# Patient Record
Sex: MALE | NOT HISPANIC OR LATINO | Employment: FULL TIME | URBAN - METROPOLITAN AREA
[De-identification: names, ages, dates, MRNs, and addresses within clinical notes are randomized per-mention and may not be internally consistent; named-entity substitution may affect disease eponyms.]

---

## 2017-01-11 ENCOUNTER — HOSPITAL ENCOUNTER (OUTPATIENT)
Facility: CLINIC | Age: 47
Setting detail: OBSERVATION
Discharge: HOME | End: 2017-01-12
Attending: EMERGENCY MEDICINE | Admitting: INTERNAL MEDICINE
Payer: COMMERCIAL

## 2017-01-11 ENCOUNTER — APPOINTMENT (OUTPATIENT)
Dept: GENERAL RADIOLOGY | Facility: CLINIC | Age: 47
End: 2017-01-11
Attending: EMERGENCY MEDICINE
Payer: COMMERCIAL

## 2017-01-11 DIAGNOSIS — K21.9 GASTROESOPHAGEAL REFLUX DISEASE WITHOUT ESOPHAGITIS: Primary | ICD-10-CM

## 2017-01-11 DIAGNOSIS — R06.02 SHORTNESS OF BREATH: ICD-10-CM

## 2017-01-11 LAB
ALBUMIN SERPL-MCNC: 3.8 G/DL (ref 3.4–5)
ALP SERPL-CCNC: 62 U/L (ref 40–150)
ALT SERPL W P-5'-P-CCNC: 39 U/L (ref 0–70)
ANION GAP SERPL CALCULATED.3IONS-SCNC: 7 MMOL/L (ref 3–14)
AST SERPL W P-5'-P-CCNC: 24 U/L (ref 0–45)
BASOPHILS # BLD AUTO: 0 10E9/L (ref 0–0.2)
BASOPHILS NFR BLD AUTO: 0.2 %
BILIRUB SERPL-MCNC: 0.6 MG/DL (ref 0.2–1.3)
BUN SERPL-MCNC: 15 MG/DL (ref 7–30)
CALCIUM SERPL-MCNC: 9.2 MG/DL (ref 8.5–10.1)
CHLORIDE SERPL-SCNC: 104 MMOL/L (ref 94–109)
CO2 SERPL-SCNC: 28 MMOL/L (ref 20–32)
CREAT SERPL-MCNC: 1.29 MG/DL (ref 0.66–1.25)
D DIMER PPP FEU-MCNC: NORMAL UG/ML FEU (ref 0–0.5)
DIFFERENTIAL METHOD BLD: NORMAL
EOSINOPHIL # BLD AUTO: 0.2 10E9/L (ref 0–0.7)
EOSINOPHIL NFR BLD AUTO: 2.6 %
ERYTHROCYTE [DISTWIDTH] IN BLOOD BY AUTOMATED COUNT: 12.5 % (ref 10–15)
GFR SERPL CREATININE-BSD FRML MDRD: 60 ML/MIN/1.7M2
GLUCOSE SERPL-MCNC: 100 MG/DL (ref 70–99)
HCT VFR BLD AUTO: 47.1 % (ref 40–53)
HGB BLD-MCNC: 16.5 G/DL (ref 13.3–17.7)
IMM GRANULOCYTES # BLD: 0 10E9/L (ref 0–0.4)
IMM GRANULOCYTES NFR BLD: 0.3 %
LYMPHOCYTES # BLD AUTO: 1.9 10E9/L (ref 0.8–5.3)
LYMPHOCYTES NFR BLD AUTO: 30.6 %
MCH RBC QN AUTO: 30.3 PG (ref 26.5–33)
MCHC RBC AUTO-ENTMCNC: 35 G/DL (ref 31.5–36.5)
MCV RBC AUTO: 87 FL (ref 78–100)
MONOCYTES # BLD AUTO: 0.5 10E9/L (ref 0–1.3)
MONOCYTES NFR BLD AUTO: 8.1 %
NEUTROPHILS # BLD AUTO: 3.6 10E9/L (ref 1.6–8.3)
NEUTROPHILS NFR BLD AUTO: 58.2 %
NRBC # BLD AUTO: 0 10*3/UL
NRBC BLD AUTO-RTO: 0 /100
PLATELET # BLD AUTO: 191 10E9/L (ref 150–450)
POTASSIUM SERPL-SCNC: 3.8 MMOL/L (ref 3.4–5.3)
PROT SERPL-MCNC: 7.8 G/DL (ref 6.8–8.8)
RBC # BLD AUTO: 5.44 10E12/L (ref 4.4–5.9)
SODIUM SERPL-SCNC: 139 MMOL/L (ref 133–144)
TROPONIN I SERPL-MCNC: NORMAL UG/L (ref 0–0.04)
WBC # BLD AUTO: 6.2 10E9/L (ref 4–11)

## 2017-01-11 PROCEDURE — G0378 HOSPITAL OBSERVATION PER HR: HCPCS

## 2017-01-11 PROCEDURE — 25000128 H RX IP 250 OP 636: Performed by: EMERGENCY MEDICINE

## 2017-01-11 PROCEDURE — 25000125 ZZHC RX 250: Performed by: EMERGENCY MEDICINE

## 2017-01-11 PROCEDURE — 85025 COMPLETE CBC W/AUTO DIFF WBC: CPT | Performed by: EMERGENCY MEDICINE

## 2017-01-11 PROCEDURE — 25000132 ZZH RX MED GY IP 250 OP 250 PS 637: Performed by: EMERGENCY MEDICINE

## 2017-01-11 PROCEDURE — 99220 ZZC INITIAL OBSERVATION CARE,LEVL III: CPT | Performed by: INTERNAL MEDICINE

## 2017-01-11 PROCEDURE — 71020 XR CHEST 2 VW: CPT

## 2017-01-11 PROCEDURE — 85379 FIBRIN DEGRADATION QUANT: CPT | Performed by: EMERGENCY MEDICINE

## 2017-01-11 PROCEDURE — 80053 COMPREHEN METABOLIC PANEL: CPT | Performed by: EMERGENCY MEDICINE

## 2017-01-11 PROCEDURE — 25000132 ZZH RX MED GY IP 250 OP 250 PS 637: Performed by: INTERNAL MEDICINE

## 2017-01-11 PROCEDURE — 84484 ASSAY OF TROPONIN QUANT: CPT | Performed by: INTERNAL MEDICINE

## 2017-01-11 PROCEDURE — 84484 ASSAY OF TROPONIN QUANT: CPT | Performed by: EMERGENCY MEDICINE

## 2017-01-11 PROCEDURE — 93005 ELECTROCARDIOGRAM TRACING: CPT

## 2017-01-11 PROCEDURE — 36415 COLL VENOUS BLD VENIPUNCTURE: CPT | Performed by: INTERNAL MEDICINE

## 2017-01-11 PROCEDURE — 96360 HYDRATION IV INFUSION INIT: CPT

## 2017-01-11 PROCEDURE — 99285 EMERGENCY DEPT VISIT HI MDM: CPT | Mod: 25

## 2017-01-11 RX ORDER — ASPIRIN 81 MG/1
324 TABLET, CHEWABLE ORAL ONCE
Status: COMPLETED | OUTPATIENT
Start: 2017-01-11 | End: 2017-01-11

## 2017-01-11 RX ORDER — MULTIVITAMIN,THERAPEUTIC
1 TABLET ORAL DAILY
COMMUNITY

## 2017-01-11 RX ORDER — NITROGLYCERIN 0.4 MG/1
0.4 TABLET SUBLINGUAL EVERY 5 MIN PRN
Status: DISCONTINUED | OUTPATIENT
Start: 2017-01-11 | End: 2017-01-12 | Stop reason: HOSPADM

## 2017-01-11 RX ORDER — ATORVASTATIN CALCIUM 20 MG/1
20 TABLET, FILM COATED ORAL EVERY EVENING
COMMUNITY
Start: 2016-07-01

## 2017-01-11 RX ORDER — NALOXONE HYDROCHLORIDE 0.4 MG/ML
.1-.4 INJECTION, SOLUTION INTRAMUSCULAR; INTRAVENOUS; SUBCUTANEOUS
Status: DISCONTINUED | OUTPATIENT
Start: 2017-01-11 | End: 2017-01-12 | Stop reason: HOSPADM

## 2017-01-11 RX ORDER — NITROGLYCERIN 0.4 MG/1
0.4 TABLET SUBLINGUAL
Status: COMPLETED | OUTPATIENT
Start: 2017-01-11 | End: 2017-01-11

## 2017-01-11 RX ORDER — MORPHINE SULFATE 2 MG/ML
1 INJECTION, SOLUTION INTRAMUSCULAR; INTRAVENOUS
Status: DISCONTINUED | OUTPATIENT
Start: 2017-01-11 | End: 2017-01-12 | Stop reason: HOSPADM

## 2017-01-11 RX ORDER — ACETAMINOPHEN 650 MG/1
650 SUPPOSITORY RECTAL EVERY 4 HOURS PRN
Status: DISCONTINUED | OUTPATIENT
Start: 2017-01-11 | End: 2017-01-12 | Stop reason: HOSPADM

## 2017-01-11 RX ORDER — ATORVASTATIN CALCIUM 10 MG/1
20 TABLET, FILM COATED ORAL EVERY EVENING
Status: DISCONTINUED | OUTPATIENT
Start: 2017-01-11 | End: 2017-01-12 | Stop reason: HOSPADM

## 2017-01-11 RX ORDER — MINOCYCLINE HYDROCHLORIDE 100 MG/1
100 CAPSULE ORAL DAILY
COMMUNITY
Start: 2016-06-23

## 2017-01-11 RX ORDER — ALUMINA, MAGNESIA, AND SIMETHICONE 2400; 2400; 240 MG/30ML; MG/30ML; MG/30ML
15 SUSPENSION ORAL ONCE
Status: COMPLETED | OUTPATIENT
Start: 2017-01-11 | End: 2017-01-11

## 2017-01-11 RX ORDER — LIDOCAINE 40 MG/G
CREAM TOPICAL
Status: DISCONTINUED | OUTPATIENT
Start: 2017-01-11 | End: 2017-01-11

## 2017-01-11 RX ORDER — ASPIRIN 81 MG/1
162 TABLET, CHEWABLE ORAL ONCE
Status: DISCONTINUED | OUTPATIENT
Start: 2017-01-11 | End: 2017-01-11

## 2017-01-11 RX ORDER — PANTOPRAZOLE SODIUM 40 MG/1
40 TABLET, DELAYED RELEASE ORAL
Status: DISCONTINUED | OUTPATIENT
Start: 2017-01-11 | End: 2017-01-12 | Stop reason: HOSPADM

## 2017-01-11 RX ORDER — ASPIRIN 81 MG/1
81 TABLET ORAL DAILY
Status: DISCONTINUED | OUTPATIENT
Start: 2017-01-12 | End: 2017-01-12 | Stop reason: HOSPADM

## 2017-01-11 RX ORDER — ALUMINA, MAGNESIA, AND SIMETHICONE 2400; 2400; 240 MG/30ML; MG/30ML; MG/30ML
15-30 SUSPENSION ORAL EVERY 4 HOURS PRN
Status: DISCONTINUED | OUTPATIENT
Start: 2017-01-11 | End: 2017-01-12 | Stop reason: HOSPADM

## 2017-01-11 RX ORDER — LIDOCAINE 40 MG/G
CREAM TOPICAL
Status: DISCONTINUED | OUTPATIENT
Start: 2017-01-11 | End: 2017-01-12 | Stop reason: HOSPADM

## 2017-01-11 RX ORDER — OXYCODONE HYDROCHLORIDE 5 MG/1
5-10 TABLET ORAL
Status: DISCONTINUED | OUTPATIENT
Start: 2017-01-11 | End: 2017-01-12 | Stop reason: HOSPADM

## 2017-01-11 RX ORDER — ACETAMINOPHEN 325 MG/1
650 TABLET ORAL EVERY 4 HOURS PRN
Status: DISCONTINUED | OUTPATIENT
Start: 2017-01-11 | End: 2017-01-12 | Stop reason: HOSPADM

## 2017-01-11 RX ADMIN — ASPIRIN 81 MG 324 MG: 81 TABLET ORAL at 08:59

## 2017-01-11 RX ADMIN — LIDOCAINE HYDROCHLORIDE 15 ML: 20 SOLUTION ORAL; TOPICAL at 09:30

## 2017-01-11 RX ADMIN — NITROGLYCERIN 0.4 MG: 0.4 TABLET SUBLINGUAL at 08:59

## 2017-01-11 RX ADMIN — SODIUM CHLORIDE 1000 ML: 9 INJECTION, SOLUTION INTRAVENOUS at 11:07

## 2017-01-11 RX ADMIN — PANTOPRAZOLE SODIUM 40 MG: 40 TABLET, DELAYED RELEASE ORAL at 15:49

## 2017-01-11 RX ADMIN — ATORVASTATIN CALCIUM 20 MG: 10 TABLET, FILM COATED ORAL at 21:16

## 2017-01-11 RX ADMIN — NITROGLYCERIN 0.4 MG: 0.4 TABLET SUBLINGUAL at 10:54

## 2017-01-11 RX ADMIN — ALUMINUM HYDROXIDE, MAGNESIUM HYDROXIDE, AND DIMETHICONE 15 ML: 400; 400; 40 SUSPENSION ORAL at 09:30

## 2017-01-11 ASSESSMENT — ENCOUNTER SYMPTOMS
NECK PAIN: 1
DIAPHORESIS: 0
CHILLS: 0
LIGHT-HEADEDNESS: 1
MYALGIAS: 0
FEVER: 0
SHORTNESS OF BREATH: 1
NAUSEA: 0

## 2017-01-11 ASSESSMENT — ACTIVITIES OF DAILY LIVING (ADL)
DRESS: 0-->INDEPENDENT
TRANSFERRING: 0-->INDEPENDENT
EATING: 0-->INDEPENDENT
FALL_HISTORY_WITHIN_LAST_SIX_MONTHS: NO
RETIRED_EATING: 0-->INDEPENDENT
AMBULATION: 0-->INDEPENDENT
BATHING: 0-->INDEPENDENT
SWALLOWING: 0-->SWALLOWS FOODS/LIQUIDS WITHOUT DIFFICULTY
AMBULATION: 0-->INDEPENDENT
COMMUNICATION: 0-->UNDERSTANDS/COMMUNICATES WITHOUT DIFFICULTY
TOILETING: 0-->INDEPENDENT
CHANGE_IN_FUNCTIONAL_STATUS_SINCE_ONSET_OF_CURRENT_ILLNESS/INJURY: NO
DRESS: 0-->INDEPENDENT
TRANSFERRING: 0-->INDEPENDENT
BATHING: 0-->INDEPENDENT
SWALLOWING: 0-->SWALLOWS FOODS/LIQUIDS WITHOUT DIFFICULTY
RETIRED_COMMUNICATION: 0-->UNDERSTANDS/COMMUNICATES WITHOUT DIFFICULTY
COGNITION: 0 - NO COGNITION ISSUES REPORTED
TOILETING: 0-->INDEPENDENT

## 2017-01-11 NOTE — ED AVS SNAPSHOT
MRN:[id number]                      After Visit Summary   1/11/2017    Suzanna Curry    MRN: [id number]           Thank you!     Thank you for choosing [facility] for your care. Our goal is always to provide you with excellent care. Hearing back from our patients is one way we can continue to improve our services. Please take a few minutes to complete the written survey that you may receive in the mail after you visit with us. Thank you!        Patient Information     Date Of Birth          1970        About your hospital stay     You were admitted on:  January 11, 2017 [facility]    You were discharged on:  January 12, 2017        Reason for your hospital stay       You were here for evaluation of chest pain.                  Who to Call     For medical emergencies, please call 911.  For non-urgent questions about your medical care, please call your primary care provider or clinic, [phone]          Attending Provider     Provider    Salima English MD Arbabi, Deandre POON MD       Primary Care Provider Office Phone # Fax #    Harrison Palma [phone]       [facility] [address]  [facility] [address]        After Care Instructions     Activity       Your activity upon discharge: activity as tolerated            Diet       Follow this diet upon discharge: Orders Placed This Encounter  Regular Diet Adult                    Follow-up Appointments     Follow-up and recommended labs and tests       Follow up with primary care provider, Harrison Palma within 7 days for hospital follow up.                  Pending Results     No orders found for last 2 day(s).            Statement of Approval     Ordered          01/12/17 1006  I have reviewed and agree with all the recommendations and orders detailed in this document.   EFFECTIVE NOW     Approved and electronically signed by:  Vannessa Kenney PA-C         "     Admission Information        Provider Department Dept Phone    2017 Deadnre Garcia MD [facility] Observation [phone]      Your Vitals Were     Blood Pressure Pulse Temperature    136/87 mmHg 66 97.8  F (36.6  C) (Oral)    Respirations Height Weight    16 1.778 m (5' 10\") 99.791 kg (220 lb)    BMI (Body Mass Index) Pulse Oximetry       31.57 kg/m2 100%       MyChart Information     [employer] lets you send messages to your doctor, view your test results, renew your prescriptions, schedule appointments and more. To sign up, go to [email or web address] . Click on \"Log in\" on the left side of the screen, which will take you to the Welcome page. Then click on \"Sign up Now\" on the right side of the page.     You will be asked to enter the access code listed below, as well as some personal information. Please follow the directions to create your username and password.     Your access code is: [email or web address]  Expires: 2017 10:14 AM     Your access code will  in 90 days. If you need help or a new code, please call your [place] clinic or [phone].        Care EveryWhere ID     This is your Care EveryWhere ID. This could be used by other organizations to access your [place] medical records  [id number]           Review of your medicines      START taking        Dose / Directions    omeprazole 20 MG CR capsule   Commonly known as:  priLOSEC   Used for:  Gastroesophageal reflux disease without esophagitis        Dose:  20 mg   Take 1 capsule (20 mg) by mouth daily   Quantity:  30 capsule   Refills:  0         CONTINUE these medicines which have NOT CHANGED        Dose / Directions    atorvastatin 20 MG tablet   Commonly known as:  LIPITOR        Dose:  20 mg   Take 20 mg by mouth every evening   Refills:  0       minocycline 100 MG capsule   Commonly known as:  MINOCIN/DYNACIN   Notes to Patient:  Resume taking as you do at home        Dose:  100 mg   Take 100 mg by mouth daily   Refills:  0       " multivitamin, therapeutic Tabs tablet   Notes to Patient:  Resume taking as you do at home        Dose:  1 tablet   Take 1 tablet by mouth daily   Refills:  0            Where to get your medicines      Some of these will need a paper prescription and others can be bought over the counter. Ask your nurse if you have questions.     You don't need a prescription for these medications    - omeprazole 20 MG CR capsule             Protect others around you: Learn how to safely use, store and throw away your medicines at www.disposemymeds.org.             Medication List: This is a list of all your medications and when to take them. Check marks below indicate your daily home schedule. Keep this list as a reference.      Medications           Morning Afternoon Evening Bedtime As Needed    atorvastatin 20 MG tablet   Commonly known as:  LIPITOR   Take 20 mg by mouth every evening   Last time this was given:  20 mg on 1/11/2017  9:16 PM                    Today 1/12               minocycline 100 MG capsule   Commonly known as:  MINOCIN/DYNACIN   Take 100 mg by mouth daily   Notes to Patient:  Resume taking as you do at home                                multivitamin, therapeutic Tabs tablet   Take 1 tablet by mouth daily   Notes to Patient:  Resume taking as you do at home                                omeprazole 20 MG CR capsule   Commonly known as:  priLOSEC   Take 1 capsule (20 mg) by mouth daily            Tomorrow 1/13

## 2017-01-11 NOTE — H&P
DATE OF ADMISSION:  01/11/2017       PRIMARY CARE PHYSICIAN:  Dr. Harrison Palma.      CHIEF COMPLAINT:  Upper chest and neck pressure-like pain.      HISTORY OF PRESENT ILLNESS:  Mr. Suzanna Curry is a delightful 46-year-old gentleman with a past medical history notable for hypertension, dyslipidemia and chronic kidney disease, stage II-III with baseline creatinine in the range of 1.2 to 1.3 who has presented to the Emergency Department at [facility] with the complaint of upper chest and neck pain.  It has been ongoing intermittently for the past 2 weeks associated with shortness of breath, at times 10/10 in severity.   It is not radiating to the back or left arm. The pain was persistent yesterday and today, which prompted the patient to come to the Emergency Department seeking medical attention.  The patient reports no history of fall or recent injury to his chest.  He reports no indigestion or acid reflux symptoms.  He does endorse excessive stress related to his job and building a house.  Notably, the pain is not truly exertional.  Over the weekend it worsened after drinking a glass of wine.  Yesterday he also felt dizzy and a sense that he was going to pass out.  He does report right toe fracture on 12/11 which limited his activity.      In the Emergency Department, the patient has been evaluated by the ER attending physician.  The electrocardiogram by my review shows sinus rhythm with sinus arrhythmia and heart rate of 74 beats per minute and no ischemic changes.  Chemistry profile shows creatinine of 1.29 which is around his baseline.  Liver function panel is normal.  Troponin I is less than 0.015.  The chest x-ray by my review shows no acute cardiopulmonary disease.  The hematology profile is unremarkable with a white count of 6.2 and hemoglobin of 16.5.  The D-dimer is less than 0.3.      In the Emergency Department, the patient received sublingual nitroglycerin to which his pain somewhat  subsided.  After the second sublingual nitroglycerin; however, his blood pressure dropped to 67/37.  According to the ER attending physician the patient was also treated with a GI cocktail which was helpful as well.  At this juncture, he has minimal discomfort in his upper chest and neck.  He is being admitted to the hospital under observation status.      PAST MEDICAL HISTORY:   1.  Dyslipidemia.   2.  Hypertension:  It improved with exercise and healthy lifestyle and he is currently off medications.   3.  Obesity, BMI of 31.5.   4.  Erectile dysfunction.   5.  Acne.   6.  Chronic kidney disease, stage II-III with baseline creatinine in the range of 1.2 to 1.3.      PAST SURGICAL HISTORY:     1.  Right knee ACL/MCL reconstruction.   2.  Jaw surgery for fracture.      FAMILY HISTORY:  Paternal grandmother had a history of myocardial infarction in her 70s.  Maternal grandmother had diabetes.  Father is 72 years old.  He has a history of dyslipidemia, diabetes mellitus type 2 and coronary artery disease with stent placement in his 60s.  Mother is 67 years old and healthy.      SOCIAL HISTORY:  The patient is .  He denies smoking or using drugs.  He drinks a glass of wine every evening.      MEDICATIONS:   1.  Atorvastatin 20 mg p.o. every evening.   2.  Minocycline 100 mg p.o. daily.   3.  Multivitamin 1 p.o. daily.      ALLERGIES:  No known drug allergies.      REVIEW OF SYSTEMS:  A 10-point review of system was performed thoroughly and was negative except as stated in history of present illness.      PHYSICAL EXAMINATION:   GENERAL:  This patient is a very pleasant gentleman who is in no acute distress.   VITAL SIGNS:  Blood pressure currently 118/79, heart rate 62, respiratory rate 14, temperature 97.5 degrees Fahrenheit, oxygen saturation 100% on room air.   HEENT:  The pupils are round, equal, reactive to light bilaterally.  There is no conjunctival pallor or scleral icterus.  The oral mucosa appears  moist.   NECK:  Supple.  There is no elevated JVP.   LUNGS:  Clear to auscultation bilaterally.  No wheezing, crackles or rhonchi.   CARDIOVASCULAR:  There is normal S1 and S2 with regular rate and rhythm.  No S3, S4 or murmur is audible.   CHEST:  There is no tenderness to palpation.   ABDOMEN:  Obese, nontender, nondistended.  Bowel sounds are present.   EXTREMITIES:  There is no calf tenderness or lower extremity edema.   SKIN:  There is no jaundice, cyanosis or acute rash.   NEUROLOGIC:  He is awake, alert, oriented x3.  There is no focal deficit on gross examination.      LABORATORY DATA:   1.  Chemistry profile:  Sodium 139, potassium 3.8, BUN 15, creatinine 1.29, calcium 9.2, total protein 7.8, ALT 39, AST 24, glucose 100.  Troponin I less than 0.15.   2.  Hematology profile:  White count 6.2, hemoglobin 16.5, platelet count 191,000, MCV 87 with differential of 58.2% neutrophils.     3.  D-dimer less than 0.3.   4.  Chest x-ray shows no acute cardiopulmonary disease.   5.  A 12-lead electrocardiogram shows sinus rhythm with sinus arrhythmia and heart rate of 74 beats per minute.      ASSESSMENT AND PLAN:  Mr. Suzanna Curry is a delightful 46-year-old gentleman with a past history notable for chronic kidney disease stage II-III, dyslipidemia, acne, erectile dysfunction, mild obesity and hypertension (improved with exercise and currently off medication) who has presented with upper chest and neck pressure-like pain.   1.  Chest pain:  It is in the upper chest location as well as neck, described as pressure, at times 10/10, intermittent, not truly exertional, associated with dizziness and shortness of breath.  The characteristics of his pain is not typical for angina, although is in the differential.  Other considerations are anxiety as well as acid reflux.  In the ER, his blood pressure dropped after receiving the second dose of nitroglycerin.  Currently, his pain has subsided after receiving nitroglycerin as  well as GI cocktail.  The electrocardiogram shows no acute ischemic changes and the chest x-ray reveals no acute findings.  He is being admitted to observation unit.  He will undergo continuous cardiac monitor.  I will trend his troponin I levels (the first troponin I was less than 0.015).  Notably, the D-dimer is less than 0.3 and pulmonary embolism is unlikely.  Once the patient is ruled out, he will undergo an exercise stress echocardiogram in the morning for cardiac risk stratification.  For now, I will treat the patient with aspirin 81 mg p.o. daily.  Additionally, I will start the patient on Protonix 40 mg p.o. b.i.d. empirically.  He will continue his prior to admission Lipitor 20 mg p.o. daily.   2.  Hypertension:  The patient has a history of hypertension which was managed with exercise and lifestyle modification and he is currently off antihypertensive regimen.  His blood pressure upon admission was 130/91 which dropped to 67/37 after receiving the second dose of sublingual nitroglycerin.  His blood pressure is currently 118/79 and stable.  His blood pressure will be closely monitored.   3.  Dyslipidemia:  At home is on atorvastatin 20 mg p.o. daily, which will be continued.  I will check a fasting lipid profile in the morning.   4.  Acne:  His prior to admission minocycline 100 mg p.o. daily will be resumed at the time of discharge due to his observation status.   5.  Chronic kidney disease:  It is a stage II-III with baseline creatinine in the range of 1.2 to 1.3. His creatinine currently is 1.29 and stable.  It will be monitored intermittently.  6.  Deep venous thrombosis prophylaxis:  The patient is ambulatory and a short hospitalization is anticipated, therefore there is no indication for chemical or mechanical venous thromboembolism prophylaxis at this juncture.   7.  Code status:  It was discussed and a full code was established.   8.  Disposition:  Anticipate less 48 hours of hospital stay.      I  would like to thank Dr. Harrison Palma for allowing the Hospitalist Service to participate in the care of this patient.         JOSE DOTY MD             D: 2017 11:50   T: 2017 12:30   MT: clayton      Name:     RENY SANFORD   MRN:      [id number]        Account:      [id number]   :      1970           Admitted:     [id number]      Document: [id number]       cc: Harrison Palma MD

## 2017-01-11 NOTE — PROVIDER NOTIFICATION
MD paged regarding diet order. Order placed for regular diet but pt will be clear liquids, no caffeine after midnight tonight for stress test in AM.

## 2017-01-11 NOTE — ED PROVIDER NOTES
"  History     Chief Complaint:  Shortness of breath    HPI   Suzanna Curry is a 46 year old male, with a history of hypertension and hyperlipidemia, who presents with shortness of breath. The patient reports that a month age he received a toe injury, prompting him to cease most exercise minus some use of the elliptical machine. Since then he has felt a \"pressure in his neck.\" For the past 2.5 weeks he has had worsening and intermittent light-headedness and shortness of breath. Since the yesterday his symptoms have become more constant and continue to worsen. He called his PCP this morning who then advised him to come here for evaluation. He denies leg pain, leg swelling, abdominal pain, nausea, chills, sweaty, or endorsing a fever.    PE/DVT RISK FACTORS:  Sex:    male  Hormones:   no  Tobacco:   no  Cancer:   no  Travel:   no  Surgery:   no  Other immobilization: no  Personal history:  no  Family history:  no    Allergies:  No known drug allergies     Medications:    Lipitor  Minocin/Dynacin     Past Medical History:    Hyperlipidemia  Hypertension     Past Surgical History:    Orthopedic surgery     Family History:    History reviewed. No pertinent family history.      Social History:  Smoking status: never  Alcohol use: yes  Marital Status:       Review of Systems   Constitutional: Negative for fever, chills and diaphoresis.   Respiratory: Positive for shortness of breath.    Cardiovascular: Negative for leg swelling.   Gastrointestinal: Negative for nausea.   Musculoskeletal: Positive for neck pain. Negative for myalgias.   Neurological: Positive for light-headedness.   All other systems reviewed and are negative.      Physical Exam     Patient Vitals for the past 24 hrs:   BP Temp Temp src Pulse Heart Rate Resp SpO2 Height Weight   01/11/17 1107 92/61 mmHg - - - 57 13 97 % - -   01/11/17 1103 (!) 79/45 mmHg - - - 47 13 99 % - -   01/11/17 1059 (!) 67/37 mmHg - - - 68 12 98 % - -   01/11/17 1045 (!) " "131/99 mmHg - - - 79 18 96 % - -   01/11/17 1030 (!) 124/92 mmHg - - - 68 16 97 % - -   01/11/17 1015 (!) 124/93 mmHg - - - 72 17 96 % - -   01/11/17 1000 125/89 mmHg - - - 69 18 96 % - -   01/11/17 0959 - - - - 76 18 98 % - -   01/11/17 0930 (!) 117/93 mmHg - - - - - - - -   01/11/17 0915 119/87 mmHg - - - 85 12 - - -   01/11/17 0900 (!) 127/97 mmHg - - - 76 22 99 % - -   01/11/17 0845 (!) 130/91 mmHg - - - 75 9 99 % - -   01/11/17 0830 (!) 144/98 mmHg - - - - - 99 % - -   01/11/17 0829 (!) 152/11 mmHg 97.5  F (36.4  C) Oral 66 66 14 100 % 1.778 m (5' 10\") 99.791 kg (220 lb)        Physical Exam  General: Resting on the gurney, appears minimally uncomfortable  Head:  The scalp, face, and head appear normal  Mouth/Throat: Mucus membranes are moist  CV:  Regular rate    Normal S1 and S2  No pathological murmur   Resp:  Breath sounds clear and equal bilaterally    Non-labored, no retractions or accessory muscle use    No coarseness    No wheezing   GI:  Abdomen is soft, no rigidity    No tenderness to palpation  MS:  Normal motor assessment of all extremities.    Good capillary refill noted.    Chest pain not reproducible with chest wall palpation. No lwer extremity, edema,  redness, swelling, or excess warmth.  Skin:   No rash or lesions noted.  Neuro: GCS: 14  Psych:  Somnolent but rousable.        Emergency Department Course   ECG (8:30:50):  Rate 74 bpm. DC interval 146. QRS duration 90. QT/QTc 368/408. P-R-T axes 52 58 24.   Sinus rhythm with marked sinus arrhythmia.   Otherwise normal ECG.   Interpreted at 0925 by Salima English MD.    Imaging:  Radiographic findings were communicated with the patient who voiced understanding of the findings.    XR chest 2 views  IMPRESSION:  Negative.   As read by Radiology.    Laboratory:  CBC: WNL (WBC 6.2, HGB 16.5, )   0840 Troponin: <0.015  CMP: Glucose 106 (H), Creatinine 1.29 (H), GFR estimate 60 (L), o/w WNL  D Dimer: <0.3    Interventions:  0859 - Aspirin 325 " mg PO  0859 - Nitrostat 0.4 mg sublingual  0930 - Xylocaine 15 mL PO  0930 - Mylanta ES/Maalox ES 15 mL PO  1054 - Nitrostat 0.4 mg sublingual  1107 - NS 1L IV Bolus    Emergency Department Course:  Past medical records, nursing notes, and vitals reviewed.  0848: I performed an exam of the patient and obtained history, as documented above.  IV inserted and blood drawn.  The patient was sent for a X-ray while in the emergency department, findings above.  1115: I discussed the case with Dr. Garcia regarding the patient.  Findings and plan explained to the Patient who consents to admission.   1117: Discussed the patient with Dr. Garcia, who will admit the patient to a medical bed for further monitoring, evaluation, and treatment.       Impression & Plan      Medical Decision Making:  Suzanna Curry is a 46 year old male who presents for evaluation of shortness of breath.    A broad differential was of course considered.  Certainly ischemia is in differential. I doubt pulmonary embolism, aortic dissection, pneumonia or pneumothorax.  Other etiologies of chest pain considered in this patient included chest wall source, esophageal spasm or GI source, pleuritis, referred pain, etc.      My suspicion of unstable angina at this point is very low and given risk/benefit ratio would not start lovenox or heparin. Given the nature and timing of the patient's symptoms, I will admit the patient  to the hospital for further workup and treatment  The patient agrees to be admitted and all questions were answered.      He is pain free at this time after given Nitro and GI cocktail medications.      Diagnosis:    ICD-10-CM    1. Shortness of breath R06.02        Disposition:  Admitted to Dr. Garcia.    Discharge Medications:  New Prescriptions    No medications on file         Hector Estrada  1/11/2017    EMERGENCY DEPARTMENT    I, Hector Estrada, am serving as a scribe at 8:48 AM on 1/11/2017 to document services personally  performed by Salima English MD based on my observations and the provider's statements to me.       Salima English MD  01/11/17 4614    Salima English MD  01/11/17 1705

## 2017-01-11 NOTE — PROGRESS NOTES
Comments: List all goals to be met before discharge home:  Nurse to notify MD when observation goals have been met and patient is ready for discharge.    serial troponins: Not met, first troponin negative.    stress test complete: Not met, planned for 1/12/17 AM

## 2017-01-11 NOTE — PLAN OF CARE
Problem: Goal Outcome Summary  Goal: Goal Outcome Summary  Outcome: Improving  VSS, Tele SB/SR. Pt up independently. Denies pain at this time. Plan for stress test tomorrow AM.

## 2017-01-11 NOTE — ED NOTES
[facility]  ED Nurse Handoff Report    ED Chief complaint: Shortness of Breath      ED Diagnosis:   Final diagnoses:   None       Code Status: Full Code    Allergies: No Known Allergies    Activity level:  Independent     Needed?: No    Isolation: No  Infection: Not Applicable    Bariatric?: No      Vital Signs:   Filed Vitals:    01/11/17 1045 01/11/17 1059 01/11/17 1103 01/11/17 1107   BP: 131/99 67/37 79/45 92/61   Pulse:       Temp:       TempSrc:       Resp: 18 12 13 13   Height:       Weight:       SpO2: 96% 98% 99% 97%       Cardiac Rhythm: ,        Pain level:      Is this patient confused?: No    Patient Report: Initial Complaint: neck tightness and SOB for couple weeks. Worsening overnight. Not associated with activity.  Focused Assessment: Tightness in neck, denies CP.  After second nitro patient became hypotensive and symptomatic but resolved neck tightness.  Tests Performed: labs, cxr  Abnormal Results: creat 1.29  Treatments provided: nitro x2, gi cocktail, 1L NS bolus    Family Comments: wife at bedside    OBS brochure/video discussed/provided to patient: Yes    ED Medications:   Medications   lidocaine 1 % 1 mL (not administered)   lidocaine (LMX4) cream (not administered)   sodium chloride (PF) 0.9% PF flush 3 mL (not administered)   sodium chloride (PF) 0.9% PF flush 3 mL (not administered)   aspirin chewable tablet 324 mg (324 mg Oral Given 1/11/17 0859)   alum & mag hydroxide-simethicone (MYLANTA ES/MAALOX  ES) suspension 15 mL (15 mLs Oral Given 1/11/17 0930)     And   lidocaine (XYLOCAINE) 2 % solution 15 mL (15 mLs Mouth/Throat Given 1/11/17 0930)   nitroglycerin (NITROSTAT) sublingual tablet 0.4 mg (0.4 mg Sublingual Given 1/11/17 0859)   nitroglycerin (NITROSTAT) sublingual tablet 0.4 mg (0.4 mg Sublingual Given 1/11/17 1054)   0.9% sodium chloride BOLUS (1,000 mLs Intravenous New Bag 1/11/17 1107)       Drips infusing?:  No      ED NURSE PHONE NUMBER:  [phone]

## 2017-01-11 NOTE — PHARMACY-ADMISSION MEDICATION HISTORY
Admission medication history interview status for the 1/11/2017  admission is complete. See EPIC admission navigator for prior to admission medications     Medication history source reliability:Good    Actions taken by pharmacist (provider contacted, etc):interviewed patient     Additional medication history information not noted on PTA med list :None    Medication reconciliation/reorder completed by provider prior to medication history? No    Time spent in this activity: 5 minutes    Prior to Admission medications    Medication Sig Last Dose Taking? Auth Provider   atorvastatin (LIPITOR) 20 MG tablet Take 20 mg by mouth every evening  1/10/2017 at Unknown time Yes Reported, Patient   minocycline (MINOCIN/DYNACIN) 100 MG capsule Take 100 mg by mouth daily  1/10/2017 at Unknown time Yes Reported, Patient   multivitamin, therapeutic (THERA-VIT) TABS tablet Take 1 tablet by mouth daily 1/10/2017 at Unknown time Yes Unknown, Entered By History

## 2017-01-11 NOTE — ED NOTES
Patient hypotensive and symptomatic after second nitro. NS bolus intiated, laid flat, raised legs, and notified MD. Patient BP coming back up.

## 2017-01-11 NOTE — ED AVS SNAPSHOT
[facility]    [address] [facility] [place] [address]    Phone:  [phone]                                       Suzanna Curry   MRN: [id number]    Department:  [facility]   Date of Visit:  1/11/2017           After Visit Summary Signature Page     I have received my discharge instructions, and my questions have been answered. I have discussed any challenges I see with this plan with the nurse or doctor.    ..........................................................................................................................................  Patient/Patient Representative Signature      ..........................................................................................................................................  Patient Representative Print Name and Relationship to Patient    ..................................................               ................................................  Date                                            Time    ..........................................................................................................................................  Reviewed by Signature/Title    ...................................................              ..............................................  Date                                                            Time

## 2017-01-12 ENCOUNTER — APPOINTMENT (OUTPATIENT)
Dept: CARDIOLOGY | Facility: CLINIC | Age: 47
End: 2017-01-12
Attending: INTERNAL MEDICINE
Payer: COMMERCIAL

## 2017-01-12 VITALS
HEIGHT: 70 IN | BODY MASS INDEX: 31.5 KG/M2 | HEART RATE: 66 BPM | RESPIRATION RATE: 16 BRPM | TEMPERATURE: 97.8 F | SYSTOLIC BLOOD PRESSURE: 136 MMHG | WEIGHT: 220 LBS | OXYGEN SATURATION: 100 % | DIASTOLIC BLOOD PRESSURE: 87 MMHG

## 2017-01-12 LAB
ANION GAP SERPL CALCULATED.3IONS-SCNC: 5 MMOL/L (ref 3–14)
BUN SERPL-MCNC: 14 MG/DL (ref 7–30)
CALCIUM SERPL-MCNC: 8.8 MG/DL (ref 8.5–10.1)
CHLORIDE SERPL-SCNC: 109 MMOL/L (ref 94–109)
CHOLEST SERPL-MCNC: 166 MG/DL
CO2 SERPL-SCNC: 28 MMOL/L (ref 20–32)
CREAT SERPL-MCNC: 1.33 MG/DL (ref 0.66–1.25)
GFR SERPL CREATININE-BSD FRML MDRD: 58 ML/MIN/1.7M2
GLUCOSE SERPL-MCNC: 95 MG/DL (ref 70–99)
HDLC SERPL-MCNC: 47 MG/DL
INTERPRETATION ECG - MUSE: NORMAL
LDLC SERPL CALC-MCNC: 88 MG/DL
NONHDLC SERPL-MCNC: 119 MG/DL
POTASSIUM SERPL-SCNC: 4.5 MMOL/L (ref 3.4–5.3)
SODIUM SERPL-SCNC: 142 MMOL/L (ref 133–144)
TRIGL SERPL-MCNC: 157 MG/DL

## 2017-01-12 PROCEDURE — 80048 BASIC METABOLIC PNL TOTAL CA: CPT | Performed by: INTERNAL MEDICINE

## 2017-01-12 PROCEDURE — 93350 STRESS TTE ONLY: CPT | Mod: 26 | Performed by: INTERNAL MEDICINE

## 2017-01-12 PROCEDURE — 99217 ZZC OBSERVATION CARE DISCHARGE: CPT | Performed by: PHYSICIAN ASSISTANT

## 2017-01-12 PROCEDURE — 93016 CV STRESS TEST SUPVJ ONLY: CPT | Performed by: INTERNAL MEDICINE

## 2017-01-12 PROCEDURE — 93325 DOPPLER ECHO COLOR FLOW MAPG: CPT | Mod: TC

## 2017-01-12 PROCEDURE — 25000132 ZZH RX MED GY IP 250 OP 250 PS 637: Performed by: INTERNAL MEDICINE

## 2017-01-12 PROCEDURE — 93325 DOPPLER ECHO COLOR FLOW MAPG: CPT | Mod: 26 | Performed by: INTERNAL MEDICINE

## 2017-01-12 PROCEDURE — 80061 LIPID PANEL: CPT | Performed by: INTERNAL MEDICINE

## 2017-01-12 PROCEDURE — 93018 CV STRESS TEST I&R ONLY: CPT | Performed by: INTERNAL MEDICINE

## 2017-01-12 PROCEDURE — G0378 HOSPITAL OBSERVATION PER HR: HCPCS

## 2017-01-12 PROCEDURE — 36415 COLL VENOUS BLD VENIPUNCTURE: CPT | Performed by: INTERNAL MEDICINE

## 2017-01-12 PROCEDURE — 93321 DOPPLER ECHO F-UP/LMTD STD: CPT | Mod: 26 | Performed by: INTERNAL MEDICINE

## 2017-01-12 RX ADMIN — PANTOPRAZOLE SODIUM 40 MG: 40 TABLET, DELAYED RELEASE ORAL at 08:39

## 2017-01-12 RX ADMIN — ASPIRIN 81 MG: 81 TABLET, COATED ORAL at 08:39

## 2017-01-12 NOTE — PROGRESS NOTES
List all goals to be met before discharge home:    1) Serial troponins:  Met, negative x3    2) Stress test complete: Partially met- Currently at stress test

## 2017-01-12 NOTE — PROGRESS NOTES
List all goals to be met before discharge home:  serial troponins:  Met, 3/3 negative  stress test complete: Not met, planned for 1/12/17 AM  Nurse to notify MD when observation goals have been met and patient is ready for discharge.

## 2017-01-12 NOTE — PLAN OF CARE
Problem: Discharge Planning  Goal: Discharge Planning (Adult, OB, Behavioral, Peds)  Outcome: No Change  A&Ox4. Up independently. Tolerating clear liquid, no caffeine diet. Denies CP, SOB, lightheadedness, dizziness, nausea. Tele NSR. VSS on RA. Trop (-)x3. Stress test complete. Wife at bedside and supportive.     Pt given discharge instructions. Questions answered. Discharge medications reviewed with patient. Follow up appointment to be scheduled by pt per his request. Pt to DC home with wife.

## 2017-01-12 NOTE — PLAN OF CARE
Problem: Goal Outcome Summary  Goal: Goal Outcome Summary  Outcome: No Change  A&Ox4. VSS on RA. Up independently. Denies pain, SOB and lightheadedness. 3 troponins negative. Tele-sinus arrythmia. Clear liquids-no caffiene. Exercise stress echo in AM

## 2017-01-12 NOTE — PLAN OF CARE
Problem: Goal Outcome Summary  Goal: Goal Outcome Summary  Outcome: Improving  Care Plan Summary Note: Pt A&Ox4 , VSS intact, afebrile. Denies N&V, headache, SOB, or dyspnea. Regular diet, and clear liquid-No caffeine after midnight. Troponins 3/3 negative. Stress test in AM. Tel-NSR. IV saline locked, independent. Continue to monitor.

## 2017-01-13 NOTE — DISCHARGE SUMMARY
[facility]    Discharge Summary  Hospitalist    Date of Admission:  1/11/2017  Date of Discharge:  1/12/2017 10:27 AM  Discharging Provider: Vannessa Kenney PA-C  Date of Service (when I saw the patient): 1/12/17    Discharge Diagnoses  Chest and neck pressure  Hypertensin  Chronic kidney disease  Obesity    History of Present Illness  Suzanna Curry is an 46 year old male with a past medical history notable for hypertension, dyslipidemia and chronic kidney disease, stage II-III with baseline creatinine in the range of 1.2 to 1.3 who presented with chest and neck pressure present intermittently for two weeks. This discomfort was accompanied by some mild SOB. It was not related to exertion. He noted significant stress related to his job and has been unable to exercise, his usual stress outlet, due to a toe fracture a few weeks ago. He denies any reflux symptoms or recent trauma. See H&P by Dr. Garcia for additional information.     On day of discharge patient is feeling well. He denies any recurrence of his chest pressure. Denies SOB, nausea, vomiting, diaphoresis. Tolerating po.     Hospital Course  Suzanna Curry was admitted on 1/11/2017.  The following problems were addressed during his hospitalization:    Atypical chest pain. Patient reported intermittent chest and neck pressure for the past two weeks. Non-exertional in nature. EKG without acute ischemia. Xray is within normal limits. D-dimer is negative. He underwent echo stress test which was negative for evidence of ischemia. Possibly related to recent anxiety. Patient typically manages this with exercise but has been unable to do so recently due to a toe fracture. Also started PPI during admission, improvement may be related to this? Will trial course of PPI prior to PCP follow up.     Recent toe fracture. Patient can initiate activity as tolerated    Chronic kidney disease, stage II-III. Patient's baseline creatinine is 1.2-1.3. At  baseline during admission.     This patient was seen and examined with Dr. Esteves who agrees with the above plan.    Vannessa Kenney PA-C    Significant Results and Procedures  See below     Code Status  Full Code       Primary Care Physician  Harrison Palma    Physical Exam                     Filed Vitals:    01/11/17 0829   Weight: 99.791 kg (220 lb)     Vital Signs with Ranges     I/O last 3 completed shifts:  In: 1000 [IV Piggyback:1000]  Out: -     Constitutional: Alert and oriented, sitting up in bed. Appears comfortable and is pleasantly conversant   ENT: normal cephalic, moist mucous membranes  Respiratory: Lungs clear to auscultation bilaterally, no increased work of breathing  Cardiovascular: Regular rate and rhythm, no murmur, no LE edema  GI: Normoactive bowel sounds, abdomen soft, non-tender  Skin/Integumen: warm, dry  MSK: moves all four extremities. No tenderness to palpation of chest wall      Discharge Disposition  Discharged to home  Condition at discharge: Stable    Consultations This Hospital Stay  None    Time Spent on This Encounter  I, Vannessa Kenney, personally saw the patient today and spent greater than 30 minutes discharging this patient.    Discharge Orders    Reason for your hospital stay   You were here for evaluation of chest pain.     Follow-up and recommended labs and tests   Follow up with primary care provider, Harrison Palma within 7 days for hospital follow up.     Activity   Your activity upon discharge: activity as tolerated     Full Code     Diet   Follow this diet upon discharge: Orders Placed This Encounter  Regular Diet Adult         Discharge Medications  Discharge Medication List as of 1/12/2017 10:18 AM      START taking these medications    Details   omeprazole (PRILOSEC) 20 MG CR capsule Take 1 capsule (20 mg) by mouth daily, Disp-30 capsule, R-0, OTC         CONTINUE these medications which have NOT CHANGED    Details   atorvastatin (LIPITOR) 20  MG tablet Take 20 mg by mouth every evening , Historical      minocycline (MINOCIN/DYNACIN) 100 MG capsule Take 100 mg by mouth daily , Historical      multivitamin, therapeutic (THERA-VIT) TABS tablet Take 1 tablet by mouth daily, Historical           Allergies  No Known Allergies  Data  Most Recent 3 CBC's:  Recent Labs   Lab Test  01/11/17   0840   WBC  6.2   HGB  16.5   MCV  87   PLT  191      Most Recent 3 BMP's:  Recent Labs   Lab Test  01/12/17   0548  01/11/17   0840   NA  142  139   POTASSIUM  4.5  3.8   CHLORIDE  109  104   CO2  28  28   BUN  14  15   CR  1.33*  1.29*   ANIONGAP  5  7   PRADIP  8.8  9.2   GLC  95  100*     Most Recent 2 LFT's:  Recent Labs   Lab Test  01/11/17   0840   AST  24   ALT  39   ALKPHOS  62   BILITOTAL  0.6     Most Recent INR's and Anticoagulation Dosing History:  Anticoagulation Dose History     There is no flowsheet data to display.        Most Recent 3 Troponin's:  Recent Labs   Lab Test  01/11/17   1546  01/11/17   1242  01/11/17   0840   TROPI  <0.015  The 99th percentile for upper reference range is 0.045 ug/L.  Troponin values in   the range of 0.045 - 0.120 ug/L may be associated with risks of adverse   clinical events.    <0.015  The 99th percentile for upper reference range is 0.045 ug/L.  Troponin values in   the range of 0.045 - 0.120 ug/L may be associated with risks of adverse   clinical events.    <0.015  The 99th percentile for upper reference range is 0.045 ug/L.  Troponin values in   the range of 0.045 - 0.120 ug/L may be associated with risks of adverse   clinical events.       Most Recent Cholesterol Panel:  Recent Labs   Lab Test  01/12/17   0548   CHOL  166   LDL  88   HDL  47   TRIG  157*     Most Recent 6 Bacteria Isolates From Any Culture (See EPIC Reports for Culture Details):No lab results found.  Most Recent TSH, T4 and A1c Labs:No lab results found.  Results for orders placed or performed during the hospital encounter of 01/11/17   XR Chest 2 Views     Narrative    XR CHEST 2 VW  1/11/2017 9:45 AM    HISTORY:  Chest pain    COMPARISON:  None.      Impression    IMPRESSION:  Negative.     LINDSAY BOWERS MD

## 2020-02-16 ENCOUNTER — HEALTH MAINTENANCE LETTER (OUTPATIENT)
Age: 50
End: 2020-02-16

## 2020-09-08 ENCOUNTER — HOSPITAL ENCOUNTER (OUTPATIENT)
Facility: CLINIC | Age: 50
End: 2020-09-08
Attending: COLON & RECTAL SURGERY | Admitting: COLON & RECTAL SURGERY

## 2020-09-09 DIAGNOSIS — Z11.59 ENCOUNTER FOR SCREENING FOR OTHER VIRAL DISEASES: Primary | ICD-10-CM

## 2020-11-29 ENCOUNTER — HEALTH MAINTENANCE LETTER (OUTPATIENT)
Age: 50
End: 2020-11-29

## 2021-04-10 ENCOUNTER — HEALTH MAINTENANCE LETTER (OUTPATIENT)
Age: 51
End: 2021-04-10

## 2021-09-19 ENCOUNTER — HEALTH MAINTENANCE LETTER (OUTPATIENT)
Age: 51
End: 2021-09-19

## 2022-05-01 ENCOUNTER — HEALTH MAINTENANCE LETTER (OUTPATIENT)
Age: 52
End: 2022-05-01

## 2022-11-21 ENCOUNTER — HEALTH MAINTENANCE LETTER (OUTPATIENT)
Age: 52
End: 2022-11-21

## 2023-06-02 ENCOUNTER — HEALTH MAINTENANCE LETTER (OUTPATIENT)
Age: 53
End: 2023-06-02

## 2023-06-07 ENCOUNTER — APPOINTMENT (OUTPATIENT)
Dept: URBAN - METROPOLITAN AREA CLINIC 256 | Age: 53
Setting detail: DERMATOLOGY
End: 2023-06-07

## 2023-06-07 VITALS — WEIGHT: 220 LBS | HEIGHT: 60 IN

## 2023-06-07 DIAGNOSIS — L81.0 POSTINFLAMMATORY HYPERPIGMENTATION: ICD-10-CM

## 2023-06-07 DIAGNOSIS — L57.8 OTHER SKIN CHANGES DUE TO CHRONIC EXPOSURE TO NONIONIZING RADIATION: ICD-10-CM

## 2023-06-07 DIAGNOSIS — L663 OTHER SPECIFIED DISEASES OF HAIR AND HAIR FOLLICLES: ICD-10-CM

## 2023-06-07 DIAGNOSIS — L738 OTHER SPECIFIED DISEASES OF HAIR AND HAIR FOLLICLES: ICD-10-CM

## 2023-06-07 DIAGNOSIS — Z71.89 OTHER SPECIFIED COUNSELING: ICD-10-CM

## 2023-06-07 DIAGNOSIS — L82.1 OTHER SEBORRHEIC KERATOSIS: ICD-10-CM

## 2023-06-07 DIAGNOSIS — D22 MELANOCYTIC NEVI: ICD-10-CM

## 2023-06-07 DIAGNOSIS — D18.0 HEMANGIOMA: ICD-10-CM

## 2023-06-07 PROBLEM — D22.62 MELANOCYTIC NEVI OF LEFT UPPER LIMB, INCLUDING SHOULDER: Status: ACTIVE | Noted: 2023-06-07

## 2023-06-07 PROBLEM — D22.39 MELANOCYTIC NEVI OF OTHER PARTS OF FACE: Status: ACTIVE | Noted: 2023-06-07

## 2023-06-07 PROBLEM — D18.01 HEMANGIOMA OF SKIN AND SUBCUTANEOUS TISSUE: Status: ACTIVE | Noted: 2023-06-07

## 2023-06-07 PROBLEM — D22.72 MELANOCYTIC NEVI OF LEFT LOWER LIMB, INCLUDING HIP: Status: ACTIVE | Noted: 2023-06-07

## 2023-06-07 PROBLEM — L02.222 FURUNCLE OF BACK [ANY PART, EXCEPT BUTTOCK]: Status: ACTIVE | Noted: 2023-06-07

## 2023-06-07 PROBLEM — D23.9 OTHER BENIGN NEOPLASM OF SKIN, UNSPECIFIED: Status: ACTIVE | Noted: 2023-06-07

## 2023-06-07 PROBLEM — D22.5 MELANOCYTIC NEVI OF TRUNK: Status: ACTIVE | Noted: 2023-06-07

## 2023-06-07 PROBLEM — D22.71 MELANOCYTIC NEVI OF RIGHT LOWER LIMB, INCLUDING HIP: Status: ACTIVE | Noted: 2023-06-07

## 2023-06-07 PROBLEM — D22.61 MELANOCYTIC NEVI OF RIGHT UPPER LIMB, INCLUDING SHOULDER: Status: ACTIVE | Noted: 2023-06-07

## 2023-06-07 PROCEDURE — 99203 OFFICE O/P NEW LOW 30 MIN: CPT

## 2023-06-07 PROCEDURE — OTHER MIPS QUALITY: OTHER

## 2023-06-07 PROCEDURE — OTHER COUNSELING: OTHER

## 2023-06-07 PROCEDURE — OTHER DEFER: OTHER

## 2023-06-07 ASSESSMENT — LOCATION ZONE DERM
LOCATION ZONE: FACE
LOCATION ZONE: TRUNK
LOCATION ZONE: LEG
LOCATION ZONE: ARM

## 2023-06-07 ASSESSMENT — LOCATION SIMPLE DESCRIPTION DERM
LOCATION SIMPLE: RIGHT POSTERIOR THIGH
LOCATION SIMPLE: LEFT POSTERIOR THIGH
LOCATION SIMPLE: RIGHT UPPER ARM
LOCATION SIMPLE: UPPER BACK
LOCATION SIMPLE: LEFT CHEEK
LOCATION SIMPLE: LEFT LOWER BACK
LOCATION SIMPLE: RIGHT UPPER BACK
LOCATION SIMPLE: LEFT UPPER ARM

## 2023-06-07 ASSESSMENT — LOCATION DETAILED DESCRIPTION DERM
LOCATION DETAILED: LEFT SUPERIOR MEDIAL MIDBACK
LOCATION DETAILED: INFERIOR THORACIC SPINE
LOCATION DETAILED: LEFT INFERIOR MEDIAL MIDBACK
LOCATION DETAILED: LEFT DISTAL POSTERIOR THIGH
LOCATION DETAILED: RIGHT MEDIAL UPPER BACK
LOCATION DETAILED: LEFT INFERIOR CENTRAL MALAR CHEEK
LOCATION DETAILED: RIGHT DISTAL POSTERIOR THIGH
LOCATION DETAILED: LEFT DISTAL POSTERIOR UPPER ARM
LOCATION DETAILED: RIGHT DISTAL POSTERIOR UPPER ARM

## 2023-06-07 NOTE — PROCEDURE: DEFER
X Size Of Lesion In Cm (Optional): 0
Introduction Text (Please End With A Colon): The following procedure was deferred:
Detail Level: Zone
Instructions (Optional): Recommended patient consult Aundrea Sewell for laser hair removal since patient is waxing and having severe reaction.

## 2024-07-13 ENCOUNTER — HOSPITAL ENCOUNTER (EMERGENCY)
Facility: CLINIC | Age: 54
Discharge: HOME | End: 2024-07-13
Attending: EMERGENCY MEDICINE | Admitting: EMERGENCY MEDICINE
Payer: COMMERCIAL

## 2024-07-13 ENCOUNTER — APPOINTMENT (OUTPATIENT)
Dept: GENERAL RADIOLOGY | Facility: CLINIC | Age: 54
End: 2024-07-13
Attending: EMERGENCY MEDICINE
Payer: COMMERCIAL

## 2024-07-13 VITALS
SYSTOLIC BLOOD PRESSURE: 155 MMHG | TEMPERATURE: 98.5 F | RESPIRATION RATE: 19 BRPM | HEART RATE: 60 BPM | OXYGEN SATURATION: 99 % | DIASTOLIC BLOOD PRESSURE: 100 MMHG

## 2024-07-13 DIAGNOSIS — R07.89 CHEST WALL PAIN: ICD-10-CM

## 2024-07-13 LAB
ANION GAP SERPL CALCULATED.3IONS-SCNC: 12 MMOL/L (ref 7–15)
BASOPHILS # BLD AUTO: 0 10E3/UL (ref 0–0.2)
BASOPHILS NFR BLD AUTO: 0 %
BUN SERPL-MCNC: 17.2 MG/DL (ref 6–20)
CALCIUM SERPL-MCNC: 9.3 MG/DL (ref 8.6–10)
CHLORIDE SERPL-SCNC: 101 MMOL/L (ref 98–107)
CREAT SERPL-MCNC: 1.22 MG/DL (ref 0.67–1.17)
DEPRECATED HCO3 PLAS-SCNC: 23 MMOL/L (ref 22–29)
EGFRCR SERPLBLD CKD-EPI 2021: 70 ML/MIN/1.73M2
EOSINOPHIL # BLD AUTO: 0.2 10E3/UL (ref 0–0.7)
EOSINOPHIL NFR BLD AUTO: 3 %
ERYTHROCYTE [DISTWIDTH] IN BLOOD BY AUTOMATED COUNT: 12.2 % (ref 10–15)
GLUCOSE SERPL-MCNC: 93 MG/DL (ref 70–99)
HCT VFR BLD AUTO: 45.4 % (ref 40–53)
HGB BLD-MCNC: 15.6 G/DL (ref 13.3–17.7)
IMM GRANULOCYTES # BLD: 0 10E3/UL
IMM GRANULOCYTES NFR BLD: 0 %
LYMPHOCYTES # BLD AUTO: 2.6 10E3/UL (ref 0.8–5.3)
LYMPHOCYTES NFR BLD AUTO: 33 %
MCH RBC QN AUTO: 30.1 PG (ref 26.5–33)
MCHC RBC AUTO-ENTMCNC: 34.4 G/DL (ref 31.5–36.5)
MCV RBC AUTO: 88 FL (ref 78–100)
MONOCYTES # BLD AUTO: 0.5 10E3/UL (ref 0–1.3)
MONOCYTES NFR BLD AUTO: 7 %
NEUTROPHILS # BLD AUTO: 4.4 10E3/UL (ref 1.6–8.3)
NEUTROPHILS NFR BLD AUTO: 56 %
NRBC # BLD AUTO: 0 10E3/UL
NRBC BLD AUTO-RTO: 0 /100
PLATELET # BLD AUTO: 228 10E3/UL (ref 150–450)
POTASSIUM SERPL-SCNC: 4.1 MMOL/L (ref 3.4–5.3)
RBC # BLD AUTO: 5.19 10E6/UL (ref 4.4–5.9)
SODIUM SERPL-SCNC: 136 MMOL/L (ref 135–145)
TROPONIN T SERPL HS-MCNC: 9 NG/L
WBC # BLD AUTO: 7.9 10E3/UL (ref 4–11)

## 2024-07-13 PROCEDURE — 84484 ASSAY OF TROPONIN QUANT: CPT | Performed by: EMERGENCY MEDICINE

## 2024-07-13 PROCEDURE — 93005 ELECTROCARDIOGRAM TRACING: CPT

## 2024-07-13 PROCEDURE — 99285 EMERGENCY DEPT VISIT HI MDM: CPT | Mod: 25

## 2024-07-13 PROCEDURE — 36415 COLL VENOUS BLD VENIPUNCTURE: CPT | Performed by: EMERGENCY MEDICINE

## 2024-07-13 PROCEDURE — 250N000013 HC RX MED GY IP 250 OP 250 PS 637: Performed by: EMERGENCY MEDICINE

## 2024-07-13 PROCEDURE — 80048 BASIC METABOLIC PNL TOTAL CA: CPT | Performed by: EMERGENCY MEDICINE

## 2024-07-13 PROCEDURE — 71046 X-RAY EXAM CHEST 2 VIEWS: CPT

## 2024-07-13 PROCEDURE — 85025 COMPLETE CBC W/AUTO DIFF WBC: CPT | Performed by: EMERGENCY MEDICINE

## 2024-07-13 RX ORDER — IBUPROFEN 600 MG/1
600 TABLET, FILM COATED ORAL ONCE
Status: COMPLETED | OUTPATIENT
Start: 2024-07-13 | End: 2024-07-13

## 2024-07-13 RX ADMIN — IBUPROFEN 600 MG: 600 TABLET ORAL at 21:29

## 2024-07-13 ASSESSMENT — COLUMBIA-SUICIDE SEVERITY RATING SCALE - C-SSRS
6. HAVE YOU EVER DONE ANYTHING, STARTED TO DO ANYTHING, OR PREPARED TO DO ANYTHING TO END YOUR LIFE?: NO
1. IN THE PAST MONTH, HAVE YOU WISHED YOU WERE DEAD OR WISHED YOU COULD GO TO SLEEP AND NOT WAKE UP?: NO
2. HAVE YOU ACTUALLY HAD ANY THOUGHTS OF KILLING YOURSELF IN THE PAST MONTH?: NO

## 2024-07-13 ASSESSMENT — ACTIVITIES OF DAILY LIVING (ADL)
ADLS_ACUITY_SCORE: 37

## 2024-07-13 NOTE — ED TRIAGE NOTES
Pt presents to ed to be evaluated for chest pain.  Pt states that he has had chest pain for the past 3 days, but states that it has gotten worse in the past couple of hours.   Pt states that the chest pain is worse when he moves his arms, or moves from a sitting to standing position. Pt denies sob, denies any cardiac hx     Triage Assessment (Adult)       Row Name 07/13/24 1757          Triage Assessment    Airway WDL WDL        Respiratory WDL    Respiratory WDL WDL        Cardiac WDL    Cardiac WDL chest pain         Cognitive/Neuro/Behavioral WDL    Cognitive/Neuro/Behavioral WDL WDL

## 2024-07-14 NOTE — ED PROVIDER NOTES
"  Emergency Department Note      History of Present Illness     Chief Complaint   Chest Pain      HPI   Suzanna Curry is a 54 year old male with history of hypertension and hyperlipidemia who presents to the ED with his wife for evaluation of chest pain. Patient presents with chest \"soreness\" for the past 3 days, worse with movements. Today, the pain worsened and is exacerbated with twisting or turning, describes it as a sharp pain. Suzanna reports the chest is tender to palpation. Pain does go away and he does not remember any specific triggers such as straining or lifting that might have caused the chest pain. Pain does not radiate into the neck or jaw. He took Advil with some pain relief. He has not tried ice packs. No associated cough, shortness of breath, diaphoresis, lightheadedness, or nausea. Denies history of diabetes, abdominal issues, heavy drinking tobacco use, history of PE/DVT, recent long travel, or lower extremity edema or soreness. No family history of early cardiac disease. He has not experienced this type of chest pain previously. Patient called the nurse triage line and was referred to the ED for further workup.      Independent Historian   Wife    Review of External Notes   None    Past Medical History     Medical History and Problem List   Hyperlipidemia   Erectile dysfunction   Obesity   Hypertension   Heart murmur     Medications   Atorvastatin  Minocycline  Omeprazole   Sertraline  Lisinopril  Tadalafil    Surgical History   Knee surgery  Mandible surgery   Gypsum teeth extraction     Physical Exam     Patient Vitals for the past 24 hrs:   BP Temp Temp src Pulse Resp SpO2   07/13/24 2217 -- -- -- -- -- 99 %   07/13/24 2215 (!) 155/100 -- -- 60 -- 98 %   07/13/24 1805 (!) 175/99 -- -- -- -- --   07/13/24 1800 (!) 190/110 98.5  F (36.9  C) Temporal 86 19 98 %     Physical Exam  Constitutional: Middle aged [place]  male, supine. No respiratory distress.  HENT: No signs of trauma.   Eyes: " EOM are normal. Pupils are equal, round, and reactive to light.   Neck: Normal range of motion. No JVD present. No cervical adenopathy.  Cardiovascular: Regular rhythm.  Exam reveals no gallop and no friction rub. 2+ radial and femoral pulses.   No murmur heard.  Pulmonary/Chest: Bilateral breath sounds normal. No wheezes, rhonchi or rales.  Abdominal: Soft. No tenderness. No rebound or guarding.   Musculoskeletal: No edema. Reproducible pain on palpation over lower anterior costochondral margins and xyphoid.    Lymphadenopathy: No lymphadenopathy.   Neurological: Alert and oriented to person, place, and time. Normal strength. Coordination normal.   Skin: Skin is warm and dry. No rash noted. No erythema.      Diagnostics     Lab Results   Labs Ordered and Resulted from Time of ED Arrival to Time of ED Departure   BASIC METABOLIC PANEL - Abnormal       Result Value    Sodium 136      Potassium 4.1      Chloride 101      Carbon Dioxide (CO2) 23      Anion Gap 12      Urea Nitrogen 17.2      Creatinine 1.22 (*)     GFR Estimate 70      Calcium 9.3      Glucose 93     TROPONIN T, HIGH SENSITIVITY - Normal    Troponin T, High Sensitivity 9     CBC WITH PLATELETS AND DIFFERENTIAL    WBC Count 7.9      RBC Count 5.19      Hemoglobin 15.6      Hematocrit 45.4      MCV 88      MCH 30.1      MCHC 34.4      RDW 12.2      Platelet Count 228      % Neutrophils 56      % Lymphocytes 33      % Monocytes 7      % Eosinophils 3      % Basophils 0      % Immature Granulocytes 0      NRBCs per 100 WBC 0      Absolute Neutrophils 4.4      Absolute Lymphocytes 2.6      Absolute Monocytes 0.5      Absolute Eosinophils 0.2      Absolute Basophils 0.0      Absolute Immature Granulocytes 0.0      Absolute NRBCs 0.0         Imaging   Chest XR,  PA & LAT   Final Result   IMPRESSION: Lungs clear.  Pulmonary vascularity normal.  No effusion.      CONCLUSION: Negative chest. No change.          EKG   ECG taken at 1759, ECG read at 1806  Normal  sinus rhythm   Normal ECG    Rate 89 bpm. NH interval 150 ms. QRS duration 86 ms. QT/QTc 366/445 ms. P-R-T axes 46 39 28.    Independent Interpretation   I reviewed Chest X-Ray     ED Course      Medications Administered   Medications   ibuprofen (ADVIL/MOTRIN) tablet 600 mg (600 mg Oral $Given 7/13/24 2129)       Procedures   Procedures     Discussion of Management   None    ED Course   ED Course as of 07/13/24 2222   Sat Jul 13, 2024 2121 I obtained history and examined the patient as noted above.    2212 I rechecked the patient and explained findings.        Optional/Additional Documentation  None    Medical Decision Making / Diagnosis     CMS Diagnoses: None    MIPS       None    MDM   Suzanna Curry is a 54 year old male who presents to the emergency department with anterior lower chest discomfort present intermittently over the past 3 days it is worse when he moves or bends he cannot recall any activity which may have brought this on.  It is a little better with Advil he has not applied cold or heat.  He denies radiation of the pain shortness of breath sweats nausea or dizziness.  He is a non-smoker has no diabetes.  On exam his pain is reproducible to palpation his EKG chest x-ray and troponin are normal.  Advil improved the pain.  I think this is musculoskeletal pain unlikely to be acute coronary disease PE or dissection.  Patient will be discharged home and should use cold and ibuprofen and follow-up with his PMD.  If symptoms change or worsen recheck in the ED.    Disposition   The patient was discharged.     Diagnosis     ICD-10-CM    1. Chest wall pain  R07.89            Scribe Disclosure:  I, Stacey Escalante, am serving as a scribe at 9:25 PM on 7/13/2024 to document services personally performed by Mati Estrada MD based on my observations and the provider's statements to me.        Mati Estrada MD  07/13/24 1761

## 2024-08-31 ENCOUNTER — HEALTH MAINTENANCE LETTER (OUTPATIENT)
Age: 54
End: 2024-08-31

## 2024-11-25 ENCOUNTER — APPOINTMENT (OUTPATIENT)
Dept: URBAN - METROPOLITAN AREA CLINIC 255 | Age: 54
Setting detail: DERMATOLOGY
End: 2024-11-25

## 2024-11-25 VITALS — WEIGHT: 230 LBS | HEIGHT: 70 IN

## 2024-11-25 DIAGNOSIS — D18.0 HEMANGIOMA: ICD-10-CM

## 2024-11-25 DIAGNOSIS — Z71.89 OTHER SPECIFIED COUNSELING: ICD-10-CM

## 2024-11-25 DIAGNOSIS — L82.1 OTHER SEBORRHEIC KERATOSIS: ICD-10-CM

## 2024-11-25 DIAGNOSIS — D22 MELANOCYTIC NEVI: ICD-10-CM

## 2024-11-25 PROBLEM — D18.01 HEMANGIOMA OF SKIN AND SUBCUTANEOUS TISSUE: Status: ACTIVE | Noted: 2024-11-25

## 2024-11-25 PROBLEM — D23.72 OTHER BENIGN NEOPLASM OF SKIN OF LEFT LOWER LIMB, INCLUDING HIP: Status: ACTIVE | Noted: 2024-11-25

## 2024-11-25 PROBLEM — D22.5 MELANOCYTIC NEVI OF TRUNK: Status: ACTIVE | Noted: 2024-11-25

## 2024-11-25 PROCEDURE — OTHER COUNSELING: OTHER

## 2024-11-25 PROCEDURE — 99213 OFFICE O/P EST LOW 20 MIN: CPT

## 2024-11-25 PROCEDURE — OTHER MIPS QUALITY: OTHER

## 2024-11-25 ASSESSMENT — LOCATION DETAILED DESCRIPTION DERM
LOCATION DETAILED: HAIR
LOCATION DETAILED: LEFT MEDIAL UPPER BACK
LOCATION DETAILED: RIGHT LATERAL MALAR CHEEK
LOCATION DETAILED: LEFT SUPERIOR MEDIAL UPPER BACK

## 2024-11-25 ASSESSMENT — LOCATION ZONE DERM
LOCATION ZONE: SCALP
LOCATION ZONE: TRUNK
LOCATION ZONE: FACE

## 2024-11-25 ASSESSMENT — LOCATION SIMPLE DESCRIPTION DERM
LOCATION SIMPLE: LEFT UPPER BACK
LOCATION SIMPLE: HAIR
LOCATION SIMPLE: RIGHT CHEEK